# Patient Record
Sex: MALE | Race: WHITE
[De-identification: names, ages, dates, MRNs, and addresses within clinical notes are randomized per-mention and may not be internally consistent; named-entity substitution may affect disease eponyms.]

---

## 2022-09-26 ENCOUNTER — HOSPITAL ENCOUNTER (OUTPATIENT)
Dept: HOSPITAL 76 - DI.S | Age: 67
Discharge: HOME | End: 2022-09-26
Attending: NURSE PRACTITIONER
Payer: MEDICARE

## 2022-09-26 DIAGNOSIS — M79.644: ICD-10-CM

## 2022-09-26 DIAGNOSIS — M25.531: Primary | ICD-10-CM

## 2022-09-26 NOTE — XRAY REPORT
PROCEDURE:  Hand 3 View RT

 

INDICATIONS:  PAIN OF RIGHT THUMB AND WRIST

 

TECHNIQUE:  3 views of the hand(s) acquired.  

 

COMPARISON:  X-ray wrist 9/26/2022.

 

FINDINGS:  

 

Bones:  No fractures or dislocations.  No suspicious bony lesions.  

 

Soft tissues:  No suspicious soft tissue calcifications.  

 

IMPRESSION:  

No visualized acute fracture or dislocation. However, occult injury cannot be excluded. Recommend huber
rt interval imaging follow-up in 7-10 days as clinically indicated for additional evaluation.

 

Reviewed by: Lorena Francis MD on 9/26/2022 5:07 PM PDT

Approved by: Lorena Francis MD on 9/26/2022 5:07 PM PDT

 

 

Station ID:  [id number]

## 2022-09-26 NOTE — XRAY REPORT
PROCEDURE:  Wrist 3 View RT

 

INDICATIONS: PAIN OF RIGHT THUMB AND WRIST

 

TECHNIQUE:  3 views of the wrist were acquired.  

 

COMPARISON:  X-ray hand 9/26/2022

 

FINDINGS:  

 

Bones:  No fractures or dislocations.  No suspicious bony lesions.  

 

Soft tissues:  No suspicious soft tissue calcifications.  

 

IMPRESSION:  

No visualized acute fracture or dislocation. However, occult injury cannot be excluded. Recommend huber
rt interval imaging follow-up in 7-10 days as clinically indicated for additional evaluation.

 

Reviewed by: Lorena Francis MD on 9/26/2022 5:07 PM PDT

Approved by: Lorena Francis MD on 9/26/2022 5:07 PM PDT

 

 

Station ID:  [id number]

## 2022-10-04 ENCOUNTER — HOSPITAL ENCOUNTER (OUTPATIENT)
Dept: HOSPITAL 76 - DI | Age: 67
Discharge: HOME | End: 2022-10-04
Attending: NURSE PRACTITIONER
Payer: MEDICARE

## 2022-10-04 DIAGNOSIS — Z87.891: ICD-10-CM

## 2022-10-04 DIAGNOSIS — Z13.6: Primary | ICD-10-CM

## 2022-10-04 DIAGNOSIS — I77.811: ICD-10-CM

## 2022-10-04 NOTE — ULTRASOUND REPORT
PROCEDURE:  Aorta Screening

 

INDICATIONS:  PAIN IN RIGHT THUMB AND WRIST

 

TECHNIQUE:  Real time scanning was performed of the aorta and iliac arteries, with image documentatio
n.  

 

COMPARISON:  None

 

FINDINGS:  

Aorta:  Proximal aortic diameter measures 2.5 cm.  Mid-aorta measures 1.9 cm.  Distal aortic diameter
 is 1.7 cm.  

 

Iliac arteries:  Right common iliac artery measures 1.1 cm.  Left common iliac artery measures 1.1 cm
.  

 

IMPRESSION:  

Abdominal aortic ectasia. 5 years sonographic surveillance recommended.

 

Reviewed by: Jodi Pelayo MD on 10/4/2022 4:34 PM PDT

Approved by: Jodi Pelayo MD on 10/4/2022 4:34 PM PDT

 

 

Station ID:  [id number]